# Patient Record
Sex: FEMALE | Race: WHITE | Employment: FULL TIME | URBAN - METROPOLITAN AREA
[De-identification: names, ages, dates, MRNs, and addresses within clinical notes are randomized per-mention and may not be internally consistent; named-entity substitution may affect disease eponyms.]

---

## 2022-06-14 LAB
ABO, EXTERNAL RESULT: NORMAL
HEP B, EXTERNAL RESULT: NEGATIVE
HEPATITIS C ANTIBODY, EXTERNAL RESULT: NEGATIVE
HIV, EXTERNAL RESULT: NON REACTIVE
RH FACTOR, EXTERNAL RESULT: POSITIVE
RPR, EXTERNAL RESULT: NON REACTIVE
RUBELLA TITER, EXTERNAL RESULT: NORMAL

## 2023-01-18 LAB — GBS, EXTERNAL RESULT: NOT DETECTED

## 2023-02-09 ENCOUNTER — HOSPITAL ENCOUNTER (INPATIENT)
Age: 28
LOS: 3 days | Discharge: HOME | End: 2023-02-12
Attending: OBSTETRICS & GYNECOLOGY | Admitting: OBSTETRICS & GYNECOLOGY
Payer: COMMERCIAL

## 2023-02-09 PROBLEM — Z34.90 ENCOUNTER FOR INDUCTION OF LABOR: Status: ACTIVE | Noted: 2023-02-09

## 2023-02-09 LAB
ABO/RH: NORMAL
AMPHETAMINE SCREEN, URINE: NORMAL
ANTIBODY SCREEN: NORMAL
BARBITURATE SCREEN URINE: NORMAL
BENZODIAZEPINE SCREEN, URINE: NORMAL
BUPRENORPHINE URINE: NORMAL
CANNABINOID SCREEN URINE: NORMAL
COCAINE METABOLITE SCREEN URINE: NORMAL
FENTANYL SCREEN, URINE: NORMAL
HCT VFR BLD CALC: 38.7 % (ref 36–48)
HEMOGLOBIN: 12.8 G/DL (ref 12–16)
Lab: NORMAL
MCH RBC QN AUTO: 30.1 PG (ref 26–34)
MCHC RBC AUTO-ENTMCNC: 33.2 G/DL (ref 31–36)
MCV RBC AUTO: 90.7 FL (ref 80–100)
METHADONE SCREEN, URINE: NORMAL
OPIATE SCREEN URINE: NORMAL
OXYCODONE URINE: NORMAL
PDW BLD-RTO: 13.3 % (ref 12.4–15.4)
PH UA: 6
PHENCYCLIDINE SCREEN URINE: NORMAL
PLATELET # BLD: 289 K/UL (ref 135–450)
PMV BLD AUTO: 8.2 FL (ref 5–10.5)
RBC # BLD: 4.26 M/UL (ref 4–5.2)
WBC # BLD: 11.2 K/UL (ref 4–11)

## 2023-02-09 PROCEDURE — 85027 COMPLETE CBC AUTOMATED: CPT

## 2023-02-09 PROCEDURE — 6360000002 HC RX W HCPCS: Performed by: OBSTETRICS & GYNECOLOGY

## 2023-02-09 PROCEDURE — 86850 RBC ANTIBODY SCREEN: CPT

## 2023-02-09 PROCEDURE — 80307 DRUG TEST PRSMV CHEM ANLYZR: CPT

## 2023-02-09 PROCEDURE — 2580000003 HC RX 258: Performed by: OBSTETRICS & GYNECOLOGY

## 2023-02-09 PROCEDURE — 86780 TREPONEMA PALLIDUM: CPT

## 2023-02-09 PROCEDURE — 1220000000 HC SEMI PRIVATE OB R&B

## 2023-02-09 PROCEDURE — 86900 BLOOD TYPING SEROLOGIC ABO: CPT

## 2023-02-09 PROCEDURE — 86901 BLOOD TYPING SEROLOGIC RH(D): CPT

## 2023-02-09 RX ORDER — SODIUM CHLORIDE, SODIUM LACTATE, POTASSIUM CHLORIDE, AND CALCIUM CHLORIDE .6; .31; .03; .02 G/100ML; G/100ML; G/100ML; G/100ML
1000 INJECTION, SOLUTION INTRAVENOUS PRN
Status: DISCONTINUED | OUTPATIENT
Start: 2023-02-09 | End: 2023-02-10

## 2023-02-09 RX ORDER — SODIUM CHLORIDE 9 MG/ML
25 INJECTION, SOLUTION INTRAVENOUS PRN
Status: DISCONTINUED | OUTPATIENT
Start: 2023-02-09 | End: 2023-02-10

## 2023-02-09 RX ORDER — METHYLERGONOVINE MALEATE 0.2 MG/ML
200 INJECTION INTRAVENOUS PRN
Status: DISCONTINUED | OUTPATIENT
Start: 2023-02-09 | End: 2023-02-10

## 2023-02-09 RX ORDER — SODIUM CHLORIDE, SODIUM LACTATE, POTASSIUM CHLORIDE, CALCIUM CHLORIDE 600; 310; 30; 20 MG/100ML; MG/100ML; MG/100ML; MG/100ML
INJECTION, SOLUTION INTRAVENOUS CONTINUOUS
Status: DISCONTINUED | OUTPATIENT
Start: 2023-02-09 | End: 2023-02-10

## 2023-02-09 RX ORDER — SODIUM CHLORIDE 0.9 % (FLUSH) 0.9 %
5-40 SYRINGE (ML) INJECTION PRN
Status: DISCONTINUED | OUTPATIENT
Start: 2023-02-09 | End: 2023-02-10

## 2023-02-09 RX ORDER — ONDANSETRON 2 MG/ML
4 INJECTION INTRAMUSCULAR; INTRAVENOUS EVERY 6 HOURS PRN
Status: DISCONTINUED | OUTPATIENT
Start: 2023-02-09 | End: 2023-02-10

## 2023-02-09 RX ORDER — SODIUM CHLORIDE 0.9 % (FLUSH) 0.9 %
5-40 SYRINGE (ML) INJECTION EVERY 12 HOURS SCHEDULED
Status: DISCONTINUED | OUTPATIENT
Start: 2023-02-09 | End: 2023-02-10

## 2023-02-09 RX ORDER — CARBOPROST TROMETHAMINE 250 UG/ML
250 INJECTION, SOLUTION INTRAMUSCULAR PRN
Status: DISCONTINUED | OUTPATIENT
Start: 2023-02-09 | End: 2023-02-10

## 2023-02-09 RX ORDER — SODIUM CHLORIDE, SODIUM LACTATE, POTASSIUM CHLORIDE, AND CALCIUM CHLORIDE .6; .31; .03; .02 G/100ML; G/100ML; G/100ML; G/100ML
500 INJECTION, SOLUTION INTRAVENOUS PRN
Status: DISCONTINUED | OUTPATIENT
Start: 2023-02-09 | End: 2023-02-10

## 2023-02-09 RX ORDER — MISOPROSTOL 100 UG/1
800 TABLET ORAL PRN
Status: DISCONTINUED | OUTPATIENT
Start: 2023-02-09 | End: 2023-02-10

## 2023-02-09 RX ADMIN — Medication 1 MILLI-UNITS/MIN: at 22:53

## 2023-02-09 RX ADMIN — Medication 1 MILLI-UNITS/MIN: at 21:48

## 2023-02-09 RX ADMIN — SODIUM CHLORIDE, POTASSIUM CHLORIDE, SODIUM LACTATE AND CALCIUM CHLORIDE: 600; 310; 30; 20 INJECTION, SOLUTION INTRAVENOUS at 21:45

## 2023-02-10 ENCOUNTER — ANESTHESIA (OUTPATIENT)
Dept: LABOR AND DELIVERY | Age: 28
End: 2023-02-10
Payer: COMMERCIAL

## 2023-02-10 ENCOUNTER — ANESTHESIA EVENT (OUTPATIENT)
Dept: LABOR AND DELIVERY | Age: 28
End: 2023-02-10
Payer: COMMERCIAL

## 2023-02-10 LAB
A/G RATIO: 1 (ref 1.1–2.2)
ALBUMIN SERPL-MCNC: 2.9 G/DL (ref 3.4–5)
ALP BLD-CCNC: 157 U/L (ref 40–129)
ALT SERPL-CCNC: 11 U/L (ref 10–40)
ANION GAP SERPL CALCULATED.3IONS-SCNC: 9 MMOL/L (ref 3–16)
AST SERPL-CCNC: 34 U/L (ref 15–37)
BILIRUB SERPL-MCNC: 0.3 MG/DL (ref 0–1)
BUN BLDV-MCNC: 7 MG/DL (ref 7–20)
CALCIUM SERPL-MCNC: 8.9 MG/DL (ref 8.3–10.6)
CHLORIDE BLD-SCNC: 109 MMOL/L (ref 99–110)
CO2: 19 MMOL/L (ref 21–32)
CREAT SERPL-MCNC: 0.7 MG/DL (ref 0.6–1.1)
GFR SERPL CREATININE-BSD FRML MDRD: >60 ML/MIN/{1.73_M2}
GLUCOSE BLD-MCNC: 74 MG/DL (ref 70–99)
POTASSIUM SERPL-SCNC: 4.8 MMOL/L (ref 3.5–5.1)
SODIUM BLD-SCNC: 137 MMOL/L (ref 136–145)
TOTAL PROTEIN: 5.8 G/DL (ref 6.4–8.2)
TOTAL SYPHILLIS IGG/IGM: NORMAL

## 2023-02-10 PROCEDURE — 6360000002 HC RX W HCPCS: Performed by: ANESTHESIOLOGY

## 2023-02-10 PROCEDURE — 6360000002 HC RX W HCPCS: Performed by: OBSTETRICS & GYNECOLOGY

## 2023-02-10 PROCEDURE — 0KQM0ZZ REPAIR PERINEUM MUSCLE, OPEN APPROACH: ICD-10-PCS | Performed by: OBSTETRICS & GYNECOLOGY

## 2023-02-10 PROCEDURE — 3700000025 EPIDURAL BLOCK: Performed by: ANESTHESIOLOGY

## 2023-02-10 PROCEDURE — 2500000003 HC RX 250 WO HCPCS: Performed by: NURSE ANESTHETIST, CERTIFIED REGISTERED

## 2023-02-10 PROCEDURE — 80053 COMPREHEN METABOLIC PANEL: CPT

## 2023-02-10 PROCEDURE — 2580000003 HC RX 258: Performed by: OBSTETRICS & GYNECOLOGY

## 2023-02-10 PROCEDURE — 3E033VJ INTRODUCTION OF OTHER HORMONE INTO PERIPHERAL VEIN, PERCUTANEOUS APPROACH: ICD-10-PCS | Performed by: OBSTETRICS & GYNECOLOGY

## 2023-02-10 PROCEDURE — 7200000001 HC VAGINAL DELIVERY

## 2023-02-10 PROCEDURE — 10907ZC DRAINAGE OF AMNIOTIC FLUID, THERAPEUTIC FROM PRODUCTS OF CONCEPTION, VIA NATURAL OR ARTIFICIAL OPENING: ICD-10-PCS | Performed by: OBSTETRICS & GYNECOLOGY

## 2023-02-10 PROCEDURE — 1200000000 HC SEMI PRIVATE

## 2023-02-10 PROCEDURE — 6370000000 HC RX 637 (ALT 250 FOR IP): Performed by: OBSTETRICS & GYNECOLOGY

## 2023-02-10 RX ORDER — LABETALOL 200 MG/1
200 TABLET, FILM COATED ORAL EVERY 12 HOURS SCHEDULED
Status: DISCONTINUED | OUTPATIENT
Start: 2023-02-10 | End: 2023-02-12 | Stop reason: HOSPADM

## 2023-02-10 RX ORDER — LIDOCAINE HYDROCHLORIDE AND EPINEPHRINE 15; 5 MG/ML; UG/ML
INJECTION, SOLUTION EPIDURAL PRN
Status: DISCONTINUED | OUTPATIENT
Start: 2023-02-10 | End: 2023-02-10 | Stop reason: SDUPTHER

## 2023-02-10 RX ORDER — SIMETHICONE 80 MG
80 TABLET,CHEWABLE ORAL EVERY 6 HOURS PRN
Status: DISCONTINUED | OUTPATIENT
Start: 2023-02-10 | End: 2023-02-12 | Stop reason: HOSPADM

## 2023-02-10 RX ORDER — ACETAMINOPHEN 500 MG
1000 TABLET ORAL EVERY 8 HOURS PRN
Status: DISCONTINUED | OUTPATIENT
Start: 2023-02-10 | End: 2023-02-12 | Stop reason: HOSPADM

## 2023-02-10 RX ORDER — ONDANSETRON 2 MG/ML
4 INJECTION INTRAMUSCULAR; INTRAVENOUS EVERY 6 HOURS PRN
Status: DISCONTINUED | OUTPATIENT
Start: 2023-02-10 | End: 2023-02-12 | Stop reason: HOSPADM

## 2023-02-10 RX ORDER — LIDOCAINE HYDROCHLORIDE 10 MG/ML
INJECTION, SOLUTION EPIDURAL; INFILTRATION; INTRACAUDAL; PERINEURAL PRN
Status: DISCONTINUED | OUTPATIENT
Start: 2023-02-10 | End: 2023-02-10 | Stop reason: SDUPTHER

## 2023-02-10 RX ORDER — DOCUSATE SODIUM 100 MG/1
100 CAPSULE, LIQUID FILLED ORAL 2 TIMES DAILY
Status: DISCONTINUED | OUTPATIENT
Start: 2023-02-10 | End: 2023-02-12 | Stop reason: HOSPADM

## 2023-02-10 RX ORDER — NALOXONE HYDROCHLORIDE 0.4 MG/ML
INJECTION, SOLUTION INTRAMUSCULAR; INTRAVENOUS; SUBCUTANEOUS PRN
Status: DISCONTINUED | OUTPATIENT
Start: 2023-02-10 | End: 2023-02-10

## 2023-02-10 RX ORDER — SODIUM CHLORIDE 0.9 % (FLUSH) 0.9 %
5-40 SYRINGE (ML) INJECTION PRN
Status: DISCONTINUED | OUTPATIENT
Start: 2023-02-10 | End: 2023-02-12 | Stop reason: HOSPADM

## 2023-02-10 RX ORDER — FAMOTIDINE 20 MG/1
20 TABLET, FILM COATED ORAL 2 TIMES DAILY PRN
Status: DISCONTINUED | OUTPATIENT
Start: 2023-02-10 | End: 2023-02-12 | Stop reason: HOSPADM

## 2023-02-10 RX ORDER — OXYCODONE HYDROCHLORIDE 5 MG/1
5 TABLET ORAL EVERY 4 HOURS PRN
Status: DISCONTINUED | OUTPATIENT
Start: 2023-02-10 | End: 2023-02-12 | Stop reason: HOSPADM

## 2023-02-10 RX ORDER — FENTANYL/BUPIVACAINE/NS/PF 2-1250MCG
12 PLASTIC BAG, INJECTION (ML) INJECTION CONTINUOUS
Status: DISCONTINUED | OUTPATIENT
Start: 2023-02-10 | End: 2023-02-10

## 2023-02-10 RX ORDER — OXYCODONE HYDROCHLORIDE 5 MG/1
10 TABLET ORAL EVERY 4 HOURS PRN
Status: DISCONTINUED | OUTPATIENT
Start: 2023-02-10 | End: 2023-02-12 | Stop reason: HOSPADM

## 2023-02-10 RX ORDER — IBUPROFEN 800 MG/1
800 TABLET ORAL EVERY 8 HOURS PRN
Qty: 30 TABLET | Refills: 0 | Status: SHIPPED | OUTPATIENT
Start: 2023-02-10

## 2023-02-10 RX ORDER — OXYCODONE HYDROCHLORIDE 5 MG/1
5 TABLET ORAL EVERY 6 HOURS PRN
Qty: 12 TABLET | Refills: 0 | Status: SHIPPED | OUTPATIENT
Start: 2023-02-10 | End: 2023-02-13

## 2023-02-10 RX ORDER — SODIUM CHLORIDE 9 MG/ML
INJECTION, SOLUTION INTRAVENOUS PRN
Status: DISCONTINUED | OUTPATIENT
Start: 2023-02-10 | End: 2023-02-12 | Stop reason: HOSPADM

## 2023-02-10 RX ORDER — SODIUM CHLORIDE 0.9 % (FLUSH) 0.9 %
5-40 SYRINGE (ML) INJECTION EVERY 12 HOURS SCHEDULED
Status: DISCONTINUED | OUTPATIENT
Start: 2023-02-10 | End: 2023-02-12 | Stop reason: HOSPADM

## 2023-02-10 RX ORDER — ONDANSETRON 4 MG/1
4 TABLET, ORALLY DISINTEGRATING ORAL EVERY 6 HOURS PRN
Status: DISCONTINUED | OUTPATIENT
Start: 2023-02-10 | End: 2023-02-12 | Stop reason: HOSPADM

## 2023-02-10 RX ORDER — FERROUS SULFATE 325(65) MG
325 TABLET ORAL DAILY
Status: DISCONTINUED | OUTPATIENT
Start: 2023-02-10 | End: 2023-02-12 | Stop reason: HOSPADM

## 2023-02-10 RX ORDER — BUTORPHANOL TARTRATE 1 MG/ML
1 INJECTION, SOLUTION INTRAMUSCULAR; INTRAVENOUS
Status: DISCONTINUED | OUTPATIENT
Start: 2023-02-10 | End: 2023-02-10

## 2023-02-10 RX ORDER — DEXMEDETOMIDINE HYDROCHLORIDE 100 UG/ML
INJECTION, SOLUTION INTRAVENOUS PRN
Status: DISCONTINUED | OUTPATIENT
Start: 2023-02-10 | End: 2023-02-10 | Stop reason: SDUPTHER

## 2023-02-10 RX ORDER — SENNA AND DOCUSATE SODIUM 50; 8.6 MG/1; MG/1
1 TABLET, FILM COATED ORAL DAILY PRN
Status: DISCONTINUED | OUTPATIENT
Start: 2023-02-10 | End: 2023-02-12 | Stop reason: HOSPADM

## 2023-02-10 RX ORDER — MODIFIED LANOLIN
OINTMENT (GRAM) TOPICAL PRN
Status: DISCONTINUED | OUTPATIENT
Start: 2023-02-10 | End: 2023-02-12 | Stop reason: HOSPADM

## 2023-02-10 RX ORDER — IBUPROFEN 800 MG/1
800 TABLET ORAL EVERY 8 HOURS PRN
Status: DISCONTINUED | OUTPATIENT
Start: 2023-02-10 | End: 2023-02-12 | Stop reason: HOSPADM

## 2023-02-10 RX ORDER — KETOROLAC TROMETHAMINE 30 MG/ML
30 INJECTION, SOLUTION INTRAMUSCULAR; INTRAVENOUS EVERY 6 HOURS PRN
Status: DISCONTINUED | OUTPATIENT
Start: 2023-02-10 | End: 2023-02-12 | Stop reason: HOSPADM

## 2023-02-10 RX ORDER — DEXMEDETOMIDINE HYDROCHLORIDE 100 UG/ML
INJECTION, SOLUTION INTRAVENOUS
Status: COMPLETED
Start: 2023-02-10 | End: 2023-02-10

## 2023-02-10 RX ORDER — FENTANYL/BUPIVACAINE/NS/PF 2-1250MCG
PLASTIC BAG, INJECTION (ML) INJECTION
Status: COMPLETED
Start: 2023-02-10 | End: 2023-02-10

## 2023-02-10 RX ADMIN — BUTORPHANOL TARTRATE 1 MG: 1 INJECTION, SOLUTION INTRAMUSCULAR; INTRAVENOUS at 02:48

## 2023-02-10 RX ADMIN — HYDROCORTISONE: 25 CREAM TOPICAL at 18:42

## 2023-02-10 RX ADMIN — DOCUSATE SODIUM 100 MG: 100 CAPSULE, LIQUID FILLED ORAL at 11:27

## 2023-02-10 RX ADMIN — LIDOCAINE HYDROCHLORIDE 5 ML: 10 INJECTION, SOLUTION EPIDURAL; INFILTRATION; INTRACAUDAL; PERINEURAL at 08:40

## 2023-02-10 RX ADMIN — LIDOCAINE HYDROCHLORIDE 3 ML: 10 INJECTION, SOLUTION EPIDURAL; INFILTRATION; INTRACAUDAL; PERINEURAL at 05:13

## 2023-02-10 RX ADMIN — DEXMEDETOMIDINE HYDROCHLORIDE 30 MCG: 100 INJECTION, SOLUTION INTRAVENOUS at 08:40

## 2023-02-10 RX ADMIN — SODIUM CHLORIDE, POTASSIUM CHLORIDE, SODIUM LACTATE AND CALCIUM CHLORIDE: 600; 310; 30; 20 INJECTION, SOLUTION INTRAVENOUS at 08:11

## 2023-02-10 RX ADMIN — Medication 87.3 MILLI-UNITS/MIN: at 09:07

## 2023-02-10 RX ADMIN — SODIUM CHLORIDE, POTASSIUM CHLORIDE, SODIUM LACTATE AND CALCIUM CHLORIDE: 600; 310; 30; 20 INJECTION, SOLUTION INTRAVENOUS at 01:21

## 2023-02-10 RX ADMIN — LABETALOL HYDROCHLORIDE 200 MG: 200 TABLET, FILM COATED ORAL at 16:33

## 2023-02-10 RX ADMIN — IBUPROFEN 800 MG: 800 TABLET, FILM COATED ORAL at 14:48

## 2023-02-10 RX ADMIN — Medication 12 ML/HR: at 05:13

## 2023-02-10 RX ADMIN — ACETAMINOPHEN 1000 MG: 500 TABLET ORAL at 17:43

## 2023-02-10 RX ADMIN — Medication 1 MILLI-UNITS/MIN: at 06:09

## 2023-02-10 RX ADMIN — OXYCODONE HYDROCHLORIDE 5 MG: 5 TABLET ORAL at 18:42

## 2023-02-10 RX ADMIN — LIDOCAINE HYDROCHLORIDE AND EPINEPHRINE 3 ML: 15; 5 INJECTION, SOLUTION EPIDURAL at 05:13

## 2023-02-10 ASSESSMENT — PAIN DESCRIPTION - DESCRIPTORS: DESCRIPTORS: CRAMPING;DISCOMFORT

## 2023-02-10 ASSESSMENT — PAIN DESCRIPTION - ORIENTATION: ORIENTATION: LOWER;RIGHT

## 2023-02-10 ASSESSMENT — PAIN DESCRIPTION - LOCATION
LOCATION: PERINEUM
LOCATION: ABDOMEN;BACK

## 2023-02-10 ASSESSMENT — PAIN SCALES - GENERAL
PAINLEVEL_OUTOF10: 6
PAINLEVEL_OUTOF10: 6
PAINLEVEL_OUTOF10: 5
PAINLEVEL_OUTOF10: 4

## 2023-02-10 NOTE — PROGRESS NOTES
4 minute prolonged variable 0423. Pt turned, pitocin turned off, oxygen started via nasal cannula 0425. Dr Pamela Stark notified via telephone. OK to give epidural and reevaluate to restart pitocin in 30 minutes. Parameters given to not exceed pitocin 2 joe-units/hour.

## 2023-02-10 NOTE — DISCHARGE SUMMARY
Obstetrical Discharge Form    Gestational Age: 38w11d    Antepartum complications: none    Date of Delivery: 2/10/23      Type of Delivery: vacuum extraction    Delivered By: Prudencio Bell     Baby:      Information for the patient's :  Connor Wolfe [[id number]]   APGAR One: 8   Information for the patient's :  Haylee Solitario [[id number]]   APGAR Five: 9   Information for the patient's :  Connor Wolfe [[id number]]   Birth Weight: 7 lb 6 oz (3.345 kg)     Anesthesia: Epidural    Intrapartum complications: prolonged fetal deceleration    Postpartum complications: none    Discharge Medication:      Medication List        START taking these medications      ibuprofen 800 MG tablet  Commonly known as: ADVIL;MOTRIN  Take 1 tablet by mouth every 8 hours as needed for Pain     oxyCODONE 5 MG immediate release tablet  Commonly known as: Roxicodone  Take 1 tablet by mouth every 6 hours as needed for Pain for up to 3 days. Intended supply: 3 days. Take lowest dose possible to manage pain Max Daily Amount: 20 mg            STOP taking these medications      PRENATAL 1 + IRON PO               Where to Get Your Medications        You can get these medications from any pharmacy    Bring a paper prescription for each of these medications  ibuprofen 800 MG tablet  oxyCODONE 5 MG immediate release tablet         Discharge Condition:  good    Discharge Date: 23    PLAN:  Follow up in 6 weeks for routine PP visit  All questions answered  D/C summary begun at delivery for D/C planning purposes, any delay in discharge from ordered D/C date due to  factors.

## 2023-02-10 NOTE — ANESTHESIA PROCEDURE NOTES
Epidural Block    Patient location during procedure: OB  Start time: 2/10/2023 5:03 AM  End time: 2/10/2023 5:13 AM  Reason for block: labor epidural  Epidural  Patient position: sitting  Prep: ChloraPrep and site prepped and draped  Patient monitoring: continuous pulse ox and frequent blood pressure checks  Approach: midline  Location: L2-3  Injection technique: EVITA saline  Provider prep: mask and sterile gloves  Needle  Needle type: Tuohy   Needle gauge: 17 G  Needle length: 3.5 in  Needle insertion depth: 7 cm  Catheter type: stylet  Catheter size: 19 G  Catheter at skin depth: 12 cm  Test dose: negativeCatheter Secured: tegaderm  Assessment  Sensory level: T10  Hemodynamics: stable  Attempts: 1  Outcomes: uncomplicated  Additional Notes  Called for labor epidural analgesia request. Medical and Surgical history reviewed with pt. Risks/benefits of epidural discussed including allergic reaction, infection, bleeding, hypotension, headache, back pain, nerve damage, failed or one-sided block. Also discussed anesthesia options and associated risks in the event of . Questions answered. Verbalizes understanding and requests to proceed. FHR:  136  Pt in sitting position. Labor epidural placed using EVITA sterile technique (donned mask and sterile gloves). Back prepped with Chloraprepx2. Sterile drape applied. Site: L2-3  EVITA:   7 cm. Attempts:  1   Re-directs:  0  Site infiltrated with 1%Lidocaine. 17G Tuohy needle inserted, EVITA technique with saline. No heme, CSF, or paresthesias noted. Epidural space dilated with saline. #25ga pencan needle placed through tuohy for dural puncture. +CSF -heme or paresthesia. Spinal needle removed. Threaded epidural catheter through Tuohy needle easily. Tuohy needle withdrawn. Test Dose:      0513     Negative aspiration. 3cc of 1.5% Lido with epi 1:200,000 test dose given. Negative test dose. Skin:  Xcm catheter taped at the skin.  Secured with steri strips, tegaderm, and tape. Infusion:  . 125% Bupivacaine with Fentanyl (2ug/cc)  Auto bolus 4 ml every 20 minutes. (Max. Dose- 40 ml/hr.)      Sensory Level:  R:  t10 L:  t10    VAS: start 8/10, end 0/10    Patient in supine position with left uterine displacement.  VSS:       Preanesthetic Checklist  Completed: patient identified, IV checked, site marked, risks and benefits discussed, surgical/procedural consents, equipment checked, pre-op evaluation, timeout performed, anesthesia consent given, oxygen available, monitors applied/VS acknowledged, fire risk safety assessment completed and verbalized and blood product R/B/A discussed and consented

## 2023-02-10 NOTE — PLAN OF CARE
Problem: Vaginal Birth or  Section  Goal: Fetal and maternal status remain reassuring during the birth process  Description:  Birth OB-Pregnancy care plan goal which identifies if the fetal and maternal status remain reassuring during the birth process  Outcome: Progressing  Flowsheets (Taken 2023)  Fetal and Maternal Status Remain Reassuring During the Birth Process:   Monitor vital signs   Monitor labor progression (Vaginal delivery)   Monitor fetal heart rate   Monitor uterine activity     Problem: Infection - Adult  Goal: Absence of infection during hospitalization  Outcome: Progressing  Flowsheets (Taken 2023)  Absence of infection during hospitalization:   Assess and monitor for signs and symptoms of infection   Monitor lab/diagnostic results   Monitor all insertion sites i.e., indwelling lines, tubes and drains   Administer medications as ordered   Instruct and encourage patient and family to use good hand hygiene technique     Problem: Safety - Adult  Goal: Free from fall injury  Outcome: Progressing  Flowsheets (Taken 2023)  Free From Fall Injury: Instruct family/caregiver on patient safety

## 2023-02-10 NOTE — PLAN OF CARE
Problem: Pain  Goal: Verbalizes/displays adequate comfort level or baseline comfort level  Outcome: Progressing  Flowsheets (Taken 2023 by Brendon Torres, CHAD)  Verbalizes/displays adequate comfort level or baseline comfort level:   Encourage patient to monitor pain and request assistance   Assess pain using appropriate pain scale   Administer analgesics based on type and severity of pain and evaluate response   Implement non-pharmacological measures as appropriate and evaluate response     Problem: Postpartum  Goal: Experiences normal postpartum course  Description:  Postpartum OB-Pregnancy care plan goal which identifies if the mother is experiencing a normal postpartum course  Outcome: Progressing  Goal: Appropriate maternal -  bonding  Description:  Postpartum OB-Pregnancy care plan goal which identifies if the mother and  are bonding appropriately  Outcome: Progressing  Goal: Establishment of infant feeding pattern  Description:  Postpartum OB-Pregnancy care plan goal which identifies if the mother is establishing a feeding pattern with their   Outcome: Progressing  Goal: Incisions, wounds, or drain sites healing without S/S of infection  Outcome: Progressing     Problem: Infection - Adult  Goal: Absence of infection during hospitalization  2/10/2023 114 by Nikita Ulloa RN  Outcome: Progressing  2023 by Brendon Torres RN  Outcome: Progressing  Flowsheets (Taken 2023)  Absence of infection during hospitalization:   Assess and monitor for signs and symptoms of infection   Monitor lab/diagnostic results   Monitor all insertion sites i.e., indwelling lines, tubes and drains   Administer medications as ordered   Instruct and encourage patient and family to use good hand hygiene technique  Goal: Absence of fever/infection during anticipated neutropenic period  Outcome: Progressing     Problem: Safety - Adult  Goal: Free from fall injury  2/10/2023 1147 by Ian Eastman RN  Outcome: Progressing  2023 by Saima Ferrera RN  Outcome: Progressing  Flowsheets (Taken 2023)  Free From Fall Injury: Instruct family/caregiver on patient safety     Problem: Discharge Planning  Goal: Discharge to home or other facility with appropriate resources  Outcome: Progressing     Problem: Chronic Conditions and Co-morbidities  Goal: Patient's chronic conditions and co-morbidity symptoms are monitored and maintained or improved  Outcome: Progressing     Problem: Vaginal Birth or  Section  Goal: Fetal and maternal status remain reassuring during the birth process  Description:  Birth OB-Pregnancy care plan goal which identifies if the fetal and maternal status remain reassuring during the birth process  2/10/2023 114 by Ian Eastman RN  Outcome: Completed  2023 by Saima Ferrera RN  Outcome: Progressing  Flowsheets (Taken 2023)  Fetal and Maternal Status Remain Reassuring During the Birth Process:   Monitor vital signs   Monitor labor progression (Vaginal delivery)   Monitor fetal heart rate   Monitor uterine activity

## 2023-02-10 NOTE — ANESTHESIA PRE PROCEDURE
Department of Anesthesiology  Preprocedure Note       Name:  Tori Peck   Age:  32 y.o.  :  1995                                          MRN:  [id number]         Date:  2/10/2023      Surgeon: * No surgeons listed *    Procedure: * No procedures listed *    Medications prior to admission:   Prior to Admission medications    Medication Sig Start Date End Date Taking?  Authorizing Provider   Prenatal Multivit-Min-Fe-FA (PRENATAL 1 + IRON PO) Take by mouth   Yes Historical Provider, MD       Current medications:    Current Facility-Administered Medications   Medication Dose Route Frequency Provider Last Rate Last Admin    butorphanol (STADOL) injection 1 mg  1 mg IntraVENous Q3H PRN Eduar Huertas MD   1 mg at 02/10/23 0248    fentaNYL 2mcg/mL bupivacaine 0.125% in sodium chloride 0.9% 250mL (OB) 0.5-0.125-0.9 MG/250ML-% epidural SOLN             lactated ringers IV soln infusion   IntraVENous Continuous Eduar Huertas  mL/hr at 02/10/23 0409 Rate Verify at 02/10/23 0409    lactated ringers bolus  500 mL IntraVENous PRN Eduar Huertas MD        Or    lactated ringers bolus  1,000 mL IntraVENous PRN Eduar Huertas MD        sodium chloride flush 0.9 % injection 5-40 mL  5-40 mL IntraVENous 2 times per day Eduar Huertas MD        sodium chloride flush 0.9 % injection 5-40 mL  5-40 mL IntraVENous PRN Eduar Huertas MD        0.9 % sodium chloride infusion  25 mL IntraVENous PRN Eduar Huertas MD        methylergonovine (METHERGINE) injection 200 mcg  200 mcg IntraMUSCular PRN Eduar Huertas MD        carboprost (HEMABATE) injection 250 mcg  250 mcg IntraMUSCular PRN Eduar Huertas MD        miSOPROStol (CYTOTEC) tablet 800 mcg  800 mcg Rectal PRN Eduar Huertas MD        tranexamic acid (CYKLOKAPRON) 1,000 mg in sodium chloride 0.9 % 60 mL IVPB  1,000 mg IntraVENous Once PRN Eduar Huertas MD        oxytocin (PITOCIN) 30 units in 500 mL infusion  87.3 joe-units/min IntraVENous Continuous PRN Eduar Huertas MD And    oxytocin (PITOCIN) 10 unit bolus from the bag  10 Units IntraVENous PRN Valencia Cooper MD        ondansetron [facility]) injection 4 mg  4 mg IntraVENous Q6H PRN Valencia Cooper MD        oxytocin (PITOCIN) 30 units in 500 mL infusion  1-20 joe-units/min IntraVENous Continuous Valencia Cooper MD 4 mL/hr at 02/10/23 0409 4 joe-units/min at 02/10/23 0409       Allergies:  No Known Allergies    Problem List:    Patient Active Problem List   Diagnosis Code    Encounter for induction of labor Z34.90       Past Medical History:  History reviewed. No pertinent past medical history. Past Surgical History:  History reviewed. No pertinent surgical history.     Social History:    Social History     Tobacco Use    Smoking status: Never    Smokeless tobacco: Never   Substance Use Topics    Alcohol use: Not on file                                Counseling given: Not Answered      Vital Signs (Current):   Vitals:    02/10/23 0255 02/10/23 0326 02/10/23 0355 02/10/23 0426   BP: 123/70 138/78 132/86 (!) 129/94   Pulse: 60 53 62 68   Resp:       Temp:       TempSrc:       SpO2:                                                  BP Readings from Last 3 Encounters:   02/10/23 (!) 129/94       NPO Status:                                                                                 BMI:   Wt Readings from Last 3 Encounters:   No data found for Wt     There is no height or weight on file to calculate BMI.    CBC:   Lab Results   Component Value Date/Time    WBC 11.2 02/09/2023 08:55 PM    RBC 4.26 02/09/2023 08:55 PM    HGB 12.8 02/09/2023 08:55 PM    HCT 38.7 02/09/2023 08:55 PM    MCV 90.7 02/09/2023 08:55 PM    RDW 13.3 02/09/2023 08:55 PM     02/09/2023 08:55 PM       CMP: No results found for: NA, K, CL, CO2, BUN, CREATININE, GFRAA, AGRATIO, LABGLOM, GLUCOSE, GLU, PROT, CALCIUM, BILITOT, ALKPHOS, AST, ALT    POC Tests: No results for input(s): POCGLU, POCNA, POCK, POCCL, POCBUN, POCHEMO, POCHCT in the last 72 hours.    Coags: No results found for: PROTIME, INR, APTT    HCG (If Applicable): No results found for: PREGTESTUR, PREGSERUM, HCG, HCGQUANT     ABGs: No results found for: PHART, PO2ART, GVH2DZF, XUY1FWI, BEART, A9GBUGAV     Type & Screen (If Applicable):  No results found for: LABABO, LABRH    Drug/Infectious Status (If Applicable):  No results found for: HIV, HEPCAB    COVID-19 Screening (If Applicable): No results found for: COVID19        Anesthesia Evaluation  Patient summary reviewed and Nursing notes reviewed  Airway: Mallampati: II  TM distance: <3 FB   Neck ROM: full  Mouth opening: < 3 FB   Dental: normal exam         Pulmonary:Negative Pulmonary ROS and normal exam                               Cardiovascular:Negative CV ROS                      Neuro/Psych:   Negative Neuro/Psych ROS              GI/Hepatic/Renal: Neg GI/Hepatic/Renal ROS            Endo/Other: Negative Endo/Other ROS                    Abdominal:             Vascular: negative vascular ROS. Other Findings:        Lab Results   Component Value Date    WBC 11.2 (H) 02/09/2023    HGB 12.8 02/09/2023    HCT 38.7 02/09/2023    MCV 90.7 02/09/2023     02/09/2023     Wt Readings from Last 3 Encounters:   No data found for Wt     Temp Readings from Last 3 Encounters:   02/10/23 36.7 °C (98 °F) (Oral)     BP Readings from Last 3 Encounters:   02/10/23 (!) 129/94     Pulse Readings from Last 3 Encounters:   02/10/23 68          Anesthesia Plan      epidural     ASA 2             Anesthetic plan and risks discussed with patient. Use of blood products discussed with patient whom. Plan discussed with CRNA and attending.     Attending anesthesiologist reviewed and agrees with Preprocedure content                ZAK Zuniga CRNA   2/10/2023

## 2023-02-10 NOTE — PROGRESS NOTES
0: Called Dr. Robert Driver to come to room due to fetal deceleration. Dr. Robert Driver in room to review FHR tracing and remains in room until delivery. 0900: Legs up in stirrups and perineal prep done. Patient actively pushing. RN remains in continuous attendance at the bedside. Assessment & evaluation of fetal heart rate ongoing via continuous EFM.     0901: Vacuum applied by Dr. Robert Driver with no pop-offs and removed with infant delivery at [facility].  APGARS 8/9

## 2023-02-10 NOTE — FLOWSHEET NOTE
Discharge prescriptions for Motrin and roxicodone given to pt with instructions on use and side effects. See AVS.  Pt verbalized understanding of medications.

## 2023-02-10 NOTE — PROCEDURES
Department of Obstetrics and Gynecology  Operative Vaginal Delivery Note         Pre-operative Diagnosis:  Term pregnancy, non reassuring fetal status    Post-operative Diagnosis:  same    Procedure:  Low vacuum extraction    Surgeon:   Moody Partida for the patient's :  Seymour Joseph [[id number]]        Anesthesia: epidural    Application and Delivery:      Patient was admitted to L&D for induction of labor. Patient progressed , received ARBOW and IV Pitocin induction and did receive an epidural. She became complete at and started to push. After pushing for once it was decided to proceed with VAVD secondary to  non reassuring fetal status . Fetal position-Cephalic. A Kiwi vac cup was placed on vertex avoiding vaginal tissue and pressure raised to 450 mmHg and with next contraction mother was instructed to push and with gentle downward followed by upward traction head was delivered and cup was removed. The fetal head was at perineum, and rest of the infant delivered atraumatically, placed on the mother's abdomen. Cord was clumped and cut and infant handed off to the waiting nurse for evaluation. Cord gases was obtained and cord blood was collected. Placenta delivered with maternal pushing. The cervix, vagina and perineum were explored and second degree and bilateral sulcal lacerations were repaired. The patient tolerated the procedure well.      Number of Pop offs:  0  Number of Pulls:  1     Estimated blood loss:  350ml    Specimen:  Placenta not sent to pathology    Complications:  none  Condition:  infant stable to general nursery and mother stable

## 2023-02-10 NOTE — FLOWSHEET NOTE
Called Dr. Drew Kenyon regarding patients increased BP's x 2. Order received for 200 mg labetalol BID and to draw a CMP.

## 2023-02-10 NOTE — H&P
Department of Obstetrics and Gynecology   Obstetrics History and Physical        CHIEF COMPLAINT:  induction    HISTORY OF PRESENT ILLNESS:      The patient is a 32 y.o. female at 38w11d. OB History          1    Para        Term                AB        Living             SAB        IAB        Ectopic        Molar        Multiple        Live Births                Patient presents with a chief complaint as above and is being admitted for induction    Estimated Due Date: Estimated Date of Delivery: 23    PRENATAL CARE:    Complicated by: none    PAST OB HISTORY:  OB History          1    Para        Term                AB        Living             SAB        IAB        Ectopic        Molar        Multiple        Live Births                    Past Medical History:    History reviewed. No pertinent past medical history. Past Surgical History:    History reviewed. No pertinent surgical history. Allergies:  Patient has no known allergies. Social History:    Social History     Socioeconomic History    Marital status:      Spouse name: Not on file    Number of children: Not on file    Years of education: Not on file    Highest education level: Not on file   Occupational History    Not on file   Tobacco Use    Smoking status: Never    Smokeless tobacco: Never   Substance and Sexual Activity    Alcohol use: Not on file    Drug use: Never    Sexual activity: Yes     Partners: Male   Other Topics Concern    Not on file   Social History Narrative    Not on file     Social Determinants of Health     Financial Resource Strain: Not on file   Food Insecurity: Not on file   Transportation Needs: Not on file   Physical Activity: Not on file   Stress: Not on file   Social Connections: Not on file   Intimate Partner Violence: Not on file   Housing Stability: Not on file     Family History:   History reviewed. No pertinent family history.   Medications Prior to Admission:  Medications Prior to Admission: Prenatal Multivit-Min-Fe-FA (PRENATAL 1 + IRON PO), Take by mouth    REVIEW OF SYSTEMS:        PHYSICAL EXAM:  Vitals:    02/10/23 0226 02/10/23 0255 02/10/23 0326 02/10/23 0355   BP: (!) 153/90 123/70 138/78 132/86   Pulse: 60 60 53 62   Resp:       Temp:       TempSrc:       SpO2:         General appearance:  awake, alert, cooperative, no apparent distress, and appears stated age  Neurologic:  Awake, alert, oriented to name, place and time. Lungs:  No increased work of breathing, good air exchange  Abdomen:  Soft, non tender, gravid, consistent with her gestational age, EFW by Leopold's maneuver was 7lbs   Fetal heart rate:  Reassuring.   Pelvis:  Adequate pelvis  Cervix:   Contraction frequency:    Membranes:  Intact    Labs: CBC:   Lab Results   Component Value Date/Time    WBC 11.2 02/09/2023 08:55 PM    RBC 4.26 02/09/2023 08:55 PM    HGB 12.8 02/09/2023 08:55 PM    HCT 38.7 02/09/2023 08:55 PM    MCV 90.7 02/09/2023 08:55 PM    MCH 30.1 02/09/2023 08:55 PM    MCHC 33.2 02/09/2023 08:55 PM    RDW 13.3 02/09/2023 08:55 PM     02/09/2023 08:55 PM    MPV 8.2 02/09/2023 08:55 PM       ASSESSMENT AND PLAN:    Labor: Admit, anticipate normal delivery, routine labor orders  Fetus: Reassuring  GBS: No  Other: pitocin

## 2023-02-10 NOTE — PROGRESS NOTES
Pt up to bathroom, bladder emptied. Pt back to bed. RN at bedside. 3 minute prolonged decel x1. Pitocin stopped. RN continuing to monitor FHR.

## 2023-02-10 NOTE — PROGRESS NOTES
Pt comfortable with epidural  VSS afebrile  Exam: tight 3/80/-1  AROMed with a large amount of clear fluid  IUPC placed  Slowly increase pitocin prn to maintain adequate ctx pattern. FHT's reassuring.   Baseline 120's

## 2023-02-11 PROCEDURE — 1200000000 HC SEMI PRIVATE

## 2023-02-11 PROCEDURE — 2580000003 HC RX 258: Performed by: OBSTETRICS & GYNECOLOGY

## 2023-02-11 PROCEDURE — 6370000000 HC RX 637 (ALT 250 FOR IP): Performed by: OBSTETRICS & GYNECOLOGY

## 2023-02-11 RX ADMIN — LABETALOL HYDROCHLORIDE 200 MG: 200 TABLET, FILM COATED ORAL at 21:28

## 2023-02-11 RX ADMIN — OXYCODONE HYDROCHLORIDE 5 MG: 5 TABLET ORAL at 00:10

## 2023-02-11 RX ADMIN — IBUPROFEN 800 MG: 800 TABLET, FILM COATED ORAL at 00:09

## 2023-02-11 RX ADMIN — DOCUSATE SODIUM 100 MG: 100 CAPSULE, LIQUID FILLED ORAL at 08:45

## 2023-02-11 RX ADMIN — DOCUSATE SODIUM 100 MG: 100 CAPSULE, LIQUID FILLED ORAL at 00:09

## 2023-02-11 RX ADMIN — SODIUM CHLORIDE, PRESERVATIVE FREE 10 ML: 5 INJECTION INTRAVENOUS at 21:29

## 2023-02-11 RX ADMIN — DOCUSATE SODIUM 100 MG: 100 CAPSULE, LIQUID FILLED ORAL at 21:29

## 2023-02-11 RX ADMIN — IBUPROFEN 800 MG: 800 TABLET, FILM COATED ORAL at 08:45

## 2023-02-11 RX ADMIN — SODIUM CHLORIDE, PRESERVATIVE FREE 10 ML: 5 INJECTION INTRAVENOUS at 08:45

## 2023-02-11 RX ADMIN — IBUPROFEN 800 MG: 800 TABLET, FILM COATED ORAL at 16:42

## 2023-02-11 ASSESSMENT — PAIN DESCRIPTION - LOCATION
LOCATION: PERINEUM
LOCATION: BACK;PERINEUM
LOCATION: BACK;PERINEUM
LOCATION: PERINEUM

## 2023-02-11 ASSESSMENT — PAIN SCALES - GENERAL
PAINLEVEL_OUTOF10: 7
PAINLEVEL_OUTOF10: 7
PAINLEVEL_OUTOF10: 2

## 2023-02-11 ASSESSMENT — PAIN - FUNCTIONAL ASSESSMENT
PAIN_FUNCTIONAL_ASSESSMENT: ACTIVITIES ARE NOT PREVENTED
PAIN_FUNCTIONAL_ASSESSMENT: ACTIVITIES ARE NOT PREVENTED

## 2023-02-11 ASSESSMENT — PAIN DESCRIPTION - DESCRIPTORS
DESCRIPTORS: DISCOMFORT
DESCRIPTORS: TINGLING
DESCRIPTORS: DISCOMFORT
DESCRIPTORS: BURNING

## 2023-02-11 NOTE — ANESTHESIA POSTPROCEDURE EVALUATION
Department of Anesthesiology  Postprocedure Note    Patient: Danetta Burkitt  MRN: [id number]  YOB: 1995  Date of evaluation: 2/11/2023      Procedure Summary     Date: 02/10/23 Room / Location:     Anesthesia Start: 0503 Anesthesia Stop: 0901    Procedure: Labor Analgesia Diagnosis:     Scheduled Providers:  Responsible Provider: Barbara Tovar MD    Anesthesia Type: epidural ASA Status: 2          Anesthesia Type: No value filed. Anna Phase I:      Anna Phase II:        Anesthesia Post Evaluation    Patient location during evaluation: floor  Patient participation: complete - patient participated  Level of consciousness: awake and alert  Pain score: 0  Airway patency: patent  Nausea & Vomiting: no vomiting and no nausea  Complications: no  Cardiovascular status: hemodynamically stable  Respiratory status: acceptable  Hydration status: stable  Comments: Patient s/p epidural for L&D. Pt denies residual numbness post block. Patient is ambulating and voiding without difficulty. Patient denies back pain, headache, paresthesias, n/v or pruritus. Epidural site is free of signs of infection.

## 2023-02-11 NOTE — PROGRESS NOTES
S: no complaints, eve po, pain controlled by meds, lochia wnl, + ambulation    O: /84   Pulse 63   Temp 97.4 °F (36.3 °C) (Oral)   Resp 16   LMP 2022   SpO2 97%   Breastfeeding Unknown     Abd - soft, ff below umbilicus  Ext - trace edema    WBC/Hgb/Hct/Plts:  11.2/12.8/38.7/289 (2055)    A/P: PPD # 1   1. Doing well  2.  Anticipate D/C Home today

## 2023-02-12 VITALS
DIASTOLIC BLOOD PRESSURE: 80 MMHG | SYSTOLIC BLOOD PRESSURE: 125 MMHG | OXYGEN SATURATION: 100 % | TEMPERATURE: 97.8 F | HEART RATE: 66 BPM | RESPIRATION RATE: 16 BRPM

## 2023-02-12 PROCEDURE — 2580000003 HC RX 258: Performed by: OBSTETRICS & GYNECOLOGY

## 2023-02-12 PROCEDURE — 6370000000 HC RX 637 (ALT 250 FOR IP): Performed by: OBSTETRICS & GYNECOLOGY

## 2023-02-12 RX ADMIN — IBUPROFEN 800 MG: 800 TABLET, FILM COATED ORAL at 00:41

## 2023-02-12 RX ADMIN — IBUPROFEN 800 MG: 800 TABLET, FILM COATED ORAL at 09:03

## 2023-02-12 RX ADMIN — SODIUM CHLORIDE, PRESERVATIVE FREE 10 ML: 5 INJECTION INTRAVENOUS at 09:03

## 2023-02-12 RX ADMIN — DOCUSATE SODIUM 100 MG: 100 CAPSULE, LIQUID FILLED ORAL at 09:03

## 2023-02-12 RX ADMIN — LABETALOL HYDROCHLORIDE 200 MG: 200 TABLET, FILM COATED ORAL at 09:39

## 2023-02-12 ASSESSMENT — PAIN - FUNCTIONAL ASSESSMENT: PAIN_FUNCTIONAL_ASSESSMENT: ACTIVITIES ARE NOT PREVENTED

## 2023-02-12 ASSESSMENT — PAIN SCALES - GENERAL: PAINLEVEL_OUTOF10: 3

## 2023-02-12 ASSESSMENT — PAIN DESCRIPTION - LOCATION: LOCATION: PERINEUM

## 2023-02-12 ASSESSMENT — PAIN DESCRIPTION - DESCRIPTORS: DESCRIPTORS: SORE

## 2023-02-12 NOTE — PLAN OF CARE
Problem: Pain  Goal: Verbalizes/displays adequate comfort level or baseline comfort level  Outcome: Progressing     Problem: Postpartum  Goal: Experiences normal postpartum course  Outcome: Progressing  Goal: Appropriate maternal -  bonding  Outcome: Progressing  Goal: Establishment of infant feeding pattern  Outcome: Progressing  Goal: Incisions, wounds, or drain sites healing without S/S of infection  Outcome: Progressing     Problem: Infection - Adult  Goal: Absence of infection at discharge  Outcome: Progressing  Goal: Absence of infection during hospitalization  Outcome: Progressing  Goal: Absence of fever/infection during anticipated neutropenic period  Outcome: Progressing     Problem: Safety - Adult  Goal: Free from fall injury  Outcome: Progressing     Problem: Discharge Planning  Goal: Discharge to home or other facility with appropriate resources  Outcome: Progressing     Problem: Chronic Conditions and Co-morbidities  Goal: Patient's chronic conditions and co-morbidity symptoms are monitored and maintained or improved  Outcome: Progressing

## 2023-02-12 NOTE — FLOWSHEET NOTE
Postpartum and infant care teaching completed and forms signed by patient. Copy witnessed by RN and given to patient. Prescriptions given to pt. Patient plans to follow-up with [place] Provider as instructed. Patient verbalizes understanding of discharge instructions and denies further questions. ID bands checked. Mother's ID band and one of baby's ID bands removed and taped to footprint sheet, signed by patient and witnessed by RN. Patient discharged in stable condition accompanied by family/guardian. Discharged in wheelchair, holding baby in arms.

## 2023-02-12 NOTE — PROGRESS NOTES
Subjective:     Postpartum Day 2:  Vaginal Delivery    The patient feels well. The patient denies emotional concerns. Pain is well controlled with current medications. The baby iswell. Baby is feeding via breast. Urinary output is adequate. The patient is ambulating well. The patient is tolerating a normal diet. Flatus denies been passed. Objective:      Patient Vitals for the past 8 hrs:   BP Pulse Resp SpO2   23 0939 125/80 66 -- --   23 0430 115/78 60 16 98 %     General:    alert, appears stated age, and cooperative   Bowel Sounds:  active   Lochia:  appropriate   Uterine Fundus:   firm   DVT Evaluation:  No evidence of DVT seen on physical exam.     Lab Results   Component Value Date    WBC 11.2 (H) 2023    HGB 12.8 2023    HCT 38.7 2023    MCV 90.7 2023     2023        Assessment:   33 y/o  Status post Vaginal Delivery. Doing well. PPD2     Plan:   Continue routine postpartum care  Ambulate TID  Pain meds as needed  Discharge home with standard precautions and return to clinic in 4-6 weeks.

## 2024-09-18 LAB
ABO GROUPING: NORMAL
ANTIBODY SCREEN: NEGATIVE
BASOPHILS ABSOLUTE: 0 X10(3)/MCL (ref 0–0.1)
BASOPHILS RELATIVE PERCENT: 0.4 %
CHLAMYDIA TRACHOMATIS AMPLIFIED DET: NOT DETECTED
EOSINOPHIL # BLD: 0.7 %
EOSINOPHILS ABSOLUTE: 0.1 X10(3)/MCL (ref 0–0.5)
HB: SOURCE: NORMAL
HCT VFR BLD CALC: 39.1 % (ref 34–45)
HEMOGLOBIN: 13.1 G/DL (ref 11.2–15.7)
HEP B S AGB SURF AG: NONREACTIVE
HEPATITIS C VIRUS RNA SER/PLAS NCNC: NONREACTIVE
HIV 1+2 AB+HIV1P24 AG, EIA: NONREACTIVE
IMMATURE CELLS ABSOLUTE COUNT: 0 X10(3)/MCL (ref 0–0.1)
IMMATURE GRANULOCYTES %: 0.3 %
LYMPHOCYTES # BLD: 23.8 %
LYMPHOCYTES ABSOLUTE: 1.6 X10(3)/MCL (ref 1.2–3.9)
MCH RBC QN AUTO: 31.6 PG (ref 26–34)
MCHC RBC AUTO-ENTMCNC: 33.5 G/DL (ref 30.7–35.5)
MCV RBC AUTO: 94.4 FL (ref 80–100)
MONOCYTES ABSOLUTE: 0.5 X10(3)/MCL (ref 0.3–0.9)
MONOCYTES RELATIVE PERCENT: 6.7 %
N GONORRHOEAE AMPLIFIED DET: NOT DETECTED
NEUTROPHILS ABSOLUTE: 4.6 X10(3)/MCL (ref 1.6–6.1)
NEUTROPHILS: 68.1 %
PDW BLD-RTO: 12.6 %
PLATELET # BLD: 253 X10(3)/MCL (ref 155–369)
PMV BLD AUTO: 9.6 FL (ref 8.8–12.5)
RBC # BLD: 4.14 X10(6)/MCL (ref 3.9–5.2)
RUBELLA ANTIBODY IGG: 5.24 INDEX
SPECIMEN TYPE: NORMAL
T PALLIDUM AB: 0.05 INDEX VALUE
WBC # BLD: 6.8 X10(3)/MCL (ref 3.7–10.3)

## 2025-01-22 LAB
6-ACETYLMORPHINE, UR: NORMAL
AMPHETAMINE SCREEN URINE: NORMAL
BARBITURATE SCREEN URINE: NORMAL
BENZODIAZEPINE SCREEN, URINE: NORMAL
BUPRENORPHINE: NORMAL
CANNABINOID SCREEN URINE: NORMAL
CHLAMYDIA TRACHOMATIS AMPLIFIED DET: NOT DETECTED
COCAINE SCREEN URINE: NORMAL
CREATININE URINE: 245 MG/DL
FENTANYL URINE: NORMAL
GONADOTROPIN, CHORIONIC (HCG) QUANT: ABNORMAL MIU/ML
HB: SOURCE: NORMAL
HCT VFR BLD CALC: 36.1 % (ref 36–46)
HEMOGLOBIN: 12.5 G/DL (ref 12–15.2)
MCH RBC QN AUTO: 30.6 PG (ref 27–33)
MCHC RBC AUTO-ENTMCNC: 34.7 G/DL (ref 32–36)
MCV RBC AUTO: 88 FL (ref 82–97)
METHADONE SCREEN, URINE: NORMAL
N GONORRHOEAE AMPLIFIED DET: NOT DETECTED
OPIATE SCREEN URINE: NORMAL
OXYCODONE: NORMAL
PDW BLD-RTO: 15.6 % (ref 12.3–17)
PLATELET # BLD: 291 THOU/MCL (ref 140–375)
PMV BLD AUTO: 7.3 FL (ref 7.4–11.5)
RBC # BLD: 4.1 MIL/MCL (ref 3.8–5.2)
SPECIMEN TYPE: NORMAL
WBC # BLD: 7.1 THOU/MCL (ref 3.6–10.5)

## 2025-01-24 LAB — GONADOTROPIN, CHORIONIC (HCG) QUANT: ABNORMAL MIU/ML

## 2025-05-28 LAB
GLUCOSE CHALLENGE: 107 MG/DL (ref 70–129)
HCT VFR BLD CALC: 36.1 % (ref 36–46)
HEMOGLOBIN: 12.3 G/DL (ref 12–15.2)
MCH RBC QN AUTO: 33.1 PG (ref 27–33)
MCHC RBC AUTO-ENTMCNC: 34 G/DL (ref 32–36)
MCV RBC AUTO: 97.3 FL (ref 82–97)
PDW BLD-RTO: 12.9 % (ref 12.3–17)
PLATELET # BLD: 217 THOU/MCL (ref 140–375)
PMV BLD AUTO: 7.7 FL (ref 7.4–11.5)
RBC # BLD: 3.72 MIL/MCL (ref 3.8–5.2)
T PALLIDUM AB: 0.04 INDEX VALUE
WBC # BLD: 9.4 THOU/MCL (ref 3.6–10.5)